# Patient Record
Sex: MALE | Race: WHITE | NOT HISPANIC OR LATINO | Employment: OTHER | URBAN - METROPOLITAN AREA
[De-identification: names, ages, dates, MRNs, and addresses within clinical notes are randomized per-mention and may not be internally consistent; named-entity substitution may affect disease eponyms.]

---

## 2017-08-24 ENCOUNTER — HISTORICAL (OUTPATIENT)
Dept: RADIOLOGY | Facility: HOSPITAL | Age: 62
End: 2017-08-24

## 2018-02-23 ENCOUNTER — HISTORICAL (OUTPATIENT)
Dept: LAB | Facility: HOSPITAL | Age: 63
End: 2018-02-23

## 2018-02-23 LAB
ABS NEUT (OLG): 3.9 X10(3)/MCL (ref 1.5–6.9)
ALBUMIN SERPL-MCNC: 4.1 GM/DL (ref 3.4–5)
ALBUMIN/GLOB SERPL: 1.1 RATIO
ALP SERPL-CCNC: 63 UNIT/L (ref 30–113)
ALT SERPL-CCNC: 38 UNIT/L (ref 10–45)
APPEARANCE, UA: CLEAR
AST SERPL-CCNC: 24 UNIT/L (ref 15–37)
BILIRUB SERPL-MCNC: 2.3 MG/DL (ref 0.1–0.9)
BILIRUB UR QL STRIP: NEGATIVE
BILIRUBIN DIRECT+TOT PNL SERPL-MCNC: 0.3 MG/DL (ref 0–0.3)
BILIRUBIN DIRECT+TOT PNL SERPL-MCNC: 2 MG/DL
BUN SERPL-MCNC: 14 MG/DL (ref 10–20)
CALCIUM SERPL-MCNC: 8.9 MG/DL (ref 8–10.5)
CHLORIDE SERPL-SCNC: 105 MMOL/L (ref 100–108)
CHOLEST SERPL-MCNC: 181 MG/DL (ref 140–200)
CHOLEST/HDLC SERPL: 3 MG/DL (ref 0–8)
CO2 SERPL-SCNC: 25 MMOL/L (ref 21–35)
COLOR UR: ABNORMAL
CREAT SERPL-MCNC: 1.02 MG/DL (ref 0.7–1.3)
CRP SERPL-MCNC: <0.2 MG/DL (ref 0–0.9)
ERYTHROCYTE [DISTWIDTH] IN BLOOD BY AUTOMATED COUNT: 13.4 % (ref 11.5–17)
ERYTHROCYTE [SEDIMENTATION RATE] IN BLOOD: 1 MM/HR (ref 0–15)
GLOBULIN SER-MCNC: 3.6 GM/DL
GLUCOSE (UA): NEGATIVE
GLUCOSE SERPL-MCNC: 110 MG/DL (ref 75–116)
HCT VFR BLD AUTO: 49.1 % (ref 42–52)
HDLC SERPL-MCNC: 55 MG/DL (ref 35–59)
HEMOCCULT SP2 STL QL: NEGATIVE
HGB BLD-MCNC: 16.7 GM/DL (ref 14–18)
HGB UR QL STRIP: NEGATIVE
KETONES UR QL STRIP: ABNORMAL
LDLC SERPL CALC-MCNC: 120 MG/DL (ref 100–129)
LEUKOCYTE ESTERASE UR QL STRIP: NEGATIVE
MCH RBC QN AUTO: 32 PG (ref 27–34)
MCHC RBC AUTO-ENTMCNC: 34 GM/DL (ref 31–36)
MCV RBC AUTO: 93 FL (ref 80–99)
NITRITE UR QL STRIP: NEGATIVE
PH UR STRIP: 6 [PH]
PLATELET # BLD AUTO: 213 X10(3)/MCL (ref 140–400)
PMV BLD AUTO: 10.8 FL (ref 6.8–10)
POTASSIUM SERPL-SCNC: 4.4 MMOL/L (ref 3.6–5.2)
PROT SERPL-MCNC: 7.7 GM/DL (ref 6.4–8.2)
PROT UR QL STRIP: NEGATIVE
PSA SERPL-MCNC: 1.76 NG/ML (ref 0–4)
RBC # BLD AUTO: 5.3 X10(6)/MCL (ref 4.7–6.1)
SODIUM SERPL-SCNC: 138 MMOL/L (ref 135–145)
SP GR UR STRIP: 1.01
TRIGL SERPL-MCNC: 95 MG/DL (ref 35–150)
TSH SERPL-ACNC: 0.92 MIU/ML (ref 0.36–3.74)
UROBILINOGEN UR STRIP-ACNC: 0.2 EU/DL
VLDLC SERPL CALC-MCNC: 19 MG/DL
WBC # SPEC AUTO: 5.7 X10(3)/MCL (ref 4.5–11.5)

## 2022-08-16 DIAGNOSIS — G43.111 MIGRAINE WITH AURA, WITH INTRACTABLE MIGRAINE, SO STATED, WITH STATUS MIGRAINOSUS: Primary | ICD-10-CM

## 2023-08-07 DIAGNOSIS — M54.2 CERVICALGIA: Primary | ICD-10-CM

## 2023-08-10 ENCOUNTER — HOSPITAL ENCOUNTER (OUTPATIENT)
Dept: RADIOLOGY | Facility: HOSPITAL | Age: 68
Discharge: HOME | End: 2023-08-10
Attending: INTERNAL MEDICINE
Payer: MEDICARE

## 2023-08-10 DIAGNOSIS — M54.2 CERVICALGIA: ICD-10-CM

## 2023-08-10 PROCEDURE — 72141 MRI NECK SPINE W/O DYE: CPT | Mod: TC

## 2024-05-24 ENCOUNTER — HOSPITAL ENCOUNTER (OUTPATIENT)
Dept: RADIOLOGY | Facility: HOSPITAL | Age: 69
Discharge: HOME | End: 2024-05-24
Attending: INTERNAL MEDICINE
Payer: MEDICARE

## 2024-05-24 DIAGNOSIS — R59.0 LOCALIZED ENLARGED LYMPH NODES: ICD-10-CM

## 2024-05-24 PROCEDURE — 71046 X-RAY EXAM CHEST 2 VIEWS: CPT | Mod: TC

## 2024-05-29 ENCOUNTER — HOSPITAL ENCOUNTER (EMERGENCY)
Facility: HOSPITAL | Age: 69
Discharge: HOME | End: 2024-05-29
Attending: INTERNAL MEDICINE
Payer: MEDICARE

## 2024-05-29 VITALS
RESPIRATION RATE: 14 BRPM | HEIGHT: 66 IN | HEART RATE: 64 BPM | OXYGEN SATURATION: 98 % | SYSTOLIC BLOOD PRESSURE: 145 MMHG | TEMPERATURE: 97 F | DIASTOLIC BLOOD PRESSURE: 85 MMHG | BODY MASS INDEX: 23.46 KG/M2 | WEIGHT: 146 LBS

## 2024-05-29 DIAGNOSIS — R07.9 NONSPECIFIC CHEST PAIN: ICD-10-CM

## 2024-05-29 DIAGNOSIS — R20.2 BILATERAL ARM NUMBNESS AND TINGLING WHILE SLEEPING: Primary | ICD-10-CM

## 2024-05-29 DIAGNOSIS — R20.0 BILATERAL ARM NUMBNESS AND TINGLING WHILE SLEEPING: Primary | ICD-10-CM

## 2024-05-29 PROBLEM — N40.1 BENIGN PROSTATIC HYPERPLASIA WITH URINARY OBSTRUCTION: Status: ACTIVE | Noted: 2018-04-12

## 2024-05-29 PROBLEM — N13.8 BENIGN PROSTATIC HYPERPLASIA WITH URINARY OBSTRUCTION: Status: ACTIVE | Noted: 2018-04-12

## 2024-05-29 LAB
OHS QRS DURATION: 90 MS
OHS QTC CALCULATION: 422 MS

## 2024-05-29 PROCEDURE — 99284 EMERGENCY DEPT VISIT MOD MDM: CPT | Mod: 25

## 2024-05-29 PROCEDURE — 96372 THER/PROPH/DIAG INJ SC/IM: CPT | Performed by: INTERNAL MEDICINE

## 2024-05-29 PROCEDURE — 93005 ELECTROCARDIOGRAM TRACING: CPT

## 2024-05-29 PROCEDURE — 93010 ELECTROCARDIOGRAM REPORT: CPT | Mod: ,,, | Performed by: INTERNAL MEDICINE

## 2024-05-29 PROCEDURE — 63600175 PHARM REV CODE 636 W HCPCS: Performed by: INTERNAL MEDICINE

## 2024-05-29 RX ORDER — ROSUVASTATIN CALCIUM 20 MG/1
20 TABLET, COATED ORAL DAILY
COMMUNITY
Start: 2024-02-23

## 2024-05-29 RX ORDER — EZETIMIBE 10 MG/1
10 TABLET ORAL DAILY
COMMUNITY
Start: 2024-04-08

## 2024-05-29 RX ORDER — DIAZEPAM 10 MG/1
10 TABLET ORAL NIGHTLY
COMMUNITY
Start: 2024-02-24

## 2024-05-29 RX ORDER — DICLOFENAC POTASSIUM 50 MG/1
50 TABLET, FILM COATED ORAL 2 TIMES DAILY
COMMUNITY
Start: 2024-02-23

## 2024-05-29 RX ORDER — DEXAMETHASONE SODIUM PHOSPHATE 4 MG/ML
8 INJECTION, SOLUTION INTRA-ARTICULAR; INTRALESIONAL; INTRAMUSCULAR; INTRAVENOUS; SOFT TISSUE
Status: COMPLETED | OUTPATIENT
Start: 2024-05-29 | End: 2024-05-29

## 2024-05-29 RX ORDER — CITALOPRAM 10 MG/1
10 TABLET ORAL DAILY
COMMUNITY
Start: 2024-05-21

## 2024-05-29 RX ORDER — TRAMADOL HYDROCHLORIDE 50 MG/1
1 TABLET ORAL DAILY
COMMUNITY

## 2024-05-29 RX ORDER — METHOCARBAMOL 500 MG/1
500 TABLET, FILM COATED ORAL 2 TIMES DAILY
COMMUNITY
Start: 2024-04-19

## 2024-05-29 RX ORDER — METHYLPREDNISOLONE 4 MG/1
TABLET ORAL
Qty: 21 EACH | Refills: 0 | Status: SHIPPED | OUTPATIENT
Start: 2024-05-29

## 2024-05-29 RX ORDER — TAMSULOSIN HYDROCHLORIDE 0.4 MG/1
1 CAPSULE ORAL DAILY
COMMUNITY

## 2024-05-29 RX ORDER — AMLODIPINE BESYLATE 10 MG/1
10 TABLET ORAL DAILY
COMMUNITY
Start: 2024-05-21

## 2024-05-29 RX ADMIN — DEXAMETHASONE SODIUM PHOSPHATE 8 MG: 4 INJECTION, SOLUTION INTRA-ARTICULAR; INTRALESIONAL; INTRAMUSCULAR; INTRAVENOUS; SOFT TISSUE at 09:05

## 2024-05-29 NOTE — ED PROVIDER NOTES
Encounter Date: 5/29/2024  History from patient     History     Chief Complaint   Patient presents with    Chest Pain     Pt c/o sting pain in both arms and chest pressure x 2-3 days, states symptoms worsened yesterday.     HPI    Anish Su is 68 y.o. male who  has a past medical history of Anxiety disorder, unspecified, GERD (gastroesophageal reflux disease), Hypercholesterolemia, Hypertension, and Irritable bowel syndrome without diarrhea. arrives in ER with c/o Chest Pain (Pt c/o sting pain in both arms and chest pressure x 2-3 days, states symptoms worsened yesterday.)      Review of patient's allergies indicates:  No Known Allergies  Current Outpatient Medications   Medication Instructions    amLODIPine (NORVASC) 10 mg, Oral, Daily    citalopram (CELEXA) 10 mg, Oral, Daily    diazePAM (VALIUM) 10 mg, Oral, Nightly    diclofenac (CATAFLAM) 50 mg, Oral, 2 times daily    ezetimibe (ZETIA) 10 mg, Oral, Daily    methocarbamoL (ROBAXIN) 500 mg, Oral, 2 times daily    methylPREDNISolone (MEDROL DOSEPACK) 4 mg tablet use as directed    rosuvastatin (CRESTOR) 20 mg, Oral, Daily    tamsulosin (FLOMAX) 0.4 mg Cap 1 capsule, Oral, Daily    traMADoL (ULTRAM) 50 mg tablet 1 tablet, Oral, Daily       Past Medical History:   Diagnosis Date    Anxiety disorder, unspecified     GERD (gastroesophageal reflux disease)     Hypercholesterolemia     Hypertension     Irritable bowel syndrome without diarrhea      Past Surgical History:   Procedure Laterality Date    HERNIA REPAIR Bilateral     Inguinal     Family History   Problem Relation Name Age of Onset    Dementia Mother      Dementia Father       Social History     Tobacco Use    Smoking status: Never    Smokeless tobacco: Never   Substance Use Topics    Alcohol use: Never    Drug use: Never     Review of Systems   Constitutional:  Negative for fever.   HENT:  Negative for trouble swallowing and voice change.    Eyes:  Negative for visual disturbance.   Respiratory:  Positive  for chest tightness. Negative for cough and shortness of breath.    Cardiovascular:  Negative for chest pain.   Gastrointestinal:  Negative for abdominal pain, diarrhea and vomiting.   Genitourinary:  Negative for dysuria and hematuria.   Musculoskeletal:  Negative for back pain and gait problem.   Skin:  Negative for color change and rash.   Neurological:  Positive for numbness. Negative for headaches.        Numbness and tingling in arms off and on mainly when he gets in the morning, which gets better after some time    Psychiatric/Behavioral:  Negative for behavioral problems and sleep disturbance.    All other systems reviewed and are negative.      Physical Exam     Initial Vitals   BP Pulse Resp Temp SpO2   05/29/24 0853 05/29/24 0853 05/29/24 0856 05/29/24 0853 05/29/24 0853   (!) 167/93 70 18 97.3 °F (36.3 °C) 98 %      MAP       --                Physical Exam    Nursing note and vitals reviewed.  Constitutional: He appears well-developed and well-nourished. No distress.   HENT:   Head: Atraumatic.   Eyes: EOM are normal.   Neck: Neck supple.   Normal range of motion.  Cardiovascular:  Normal rate, regular rhythm and normal heart sounds.           Pulmonary/Chest: Breath sounds normal. No respiratory distress. He has no wheezes. He has no rales.   Abdominal: Abdomen is soft. Bowel sounds are normal.   Musculoskeletal:         General: Normal range of motion.      Cervical back: Normal range of motion and neck supple. No bony tenderness.     Neurological: He is alert and oriented to person, place, and time. He has normal strength and normal reflexes. No cranial nerve deficit or sensory deficit. GCS score is 15. GCS eye subscore is 4. GCS verbal subscore is 5. GCS motor subscore is 6.   Speech Normal   Skin: Skin is warm and dry.   Psychiatric: He has a normal mood and affect.   Pleasant         ED Course   Procedures  Labs Reviewed - No data to display     ECG Results              EKG 12-lead (Preliminary  result)  Result time 05/29/24 09:14:02      Wet Read by Fidencio Boateng MD (05/29/24 09:14:02, [facility] - Emergency Dept, Emergency Medicine)    EKG Initial Reading: Independently Interpreted by Fidencio Boateng MD. independently as: No STEMI. Rhythm: Normal Sinus Rhythm, Rate 69. Ectopy: No Ectopy. Conduction: Normal. ST Segments: Normal ST Segments. T Waves: Normal. Axis: Normal.                                   Imaging Results    None          Medications   dexAMETHasone injection 8 mg (8 mg Intramuscular Given 5/29/24 0913)     Medical Decision Making    Anish Su is 68 y.o. male who  has a past medical history of Anxiety disorder, unspecified, GERD (gastroesophageal reflux disease), Hypercholesterolemia, Hypertension, and Irritable bowel syndrome without diarrhea. arrives in ER with c/o Chest Pain (Pt c/o sting pain in both arms and chest pressure x 2-3 days, states symptoms worsened yesterday.)    Patient has been having numbness and tingling in bilateral arms for the last year or 2, he had an MRI done on the neck he was told that he has C5-C6 disc problem, he says when he wakes up in the morning his both arms are numb, they gradually get better, he says that he was working on something and it started having chest pain so he went to the cardiologist last month, they did the stress test and cardiac catheterization and he was told that he has perfectly normal blood vessels in the heart, for the last 3 or 4 days he is having some numbness in the morning again, so he woke up with numbness in the arms today again he called his neurosurgeon and they told him that they are going to call him back, then he got a little anxious and felt that he needs to come to the emergency room to get checked.  But his symptoms are resolved now.    EKG is perfectly normal on arrival.    Risk  Prescription drug management.               ED Course as of 05/29/24 0921   Wed May 29, 2024   0914 Patient has a normal  examination at this time, normal EKG at this time, he already had a cardiac catheterization done last month which was reported to him as negative, I advised him that he does not have any motor deficit or even sensory deficit at this time, the best would be to call his neurosurgeon and asked him what else needs to be done.  Patient says that he went to the neurosurgeon 9 months back, they told him that it has not significant enough and he can come back in 6 months, he went back 6 months later, they told him still not significant enough and he can just follow up next year, but he keeps having these symptoms mainly in the morning and he wants to know why he is getting these symptoms.  I have advised him that the he does not appear like having acute heart attack at this time, especially he had a normal catheterization just done last month, with a normal EKG today I do not think he is having any problem with the heart, of course he needs to talk to his neurosurgeon in his family doctor for further instructions about the numbness in the arms, patient says they prescribed him methocarbamol and meloxicam but that has not working.  I advised him that I will give him a steroid shot, so if he does have any swelling on his disc that will help it out, and the best thing would be to  the telephone and talk to the neurosurgeon again. [GQ]   7940 Patient says that he has taken Medrol Dosepak in the past, and he feels he can take it again, I will go ahead and prescribe that to him. [GQ]      ED Course User Index  [GQ] Fidencio Boateng MD                           Clinical Impression:  Final diagnoses:  [R07.9] Nonspecific chest pain  [R20.0, R20.2] Bilateral arm numbness and tingling while sleeping (Primary)          ED Disposition Condition    Discharge Stable          ED Prescriptions       Medication Sig Dispense Start Date End Date Auth. Provider    methylPREDNISolone (MEDROL DOSEPACK) 4 mg tablet use as directed 21  each 5/29/2024 -- Fidencio Boateng MD          Follow-up Information       Follow up With Specialties Details Why Contact Info    PMD  In 2 days      Neurosurgeon  Call today               Fidencio Boateng MD  05/29/24 0995

## 2024-05-29 NOTE — DISCHARGE INSTRUCTIONS
Must talk to your neurosurgeon and your family doctor for further instructions as to what can be done about your numbness and tingling in the arms        Take medicines as prescribed    See your family doctor in one to 2 days for further evaluation, workup, and treatment as necessary    Avoid driving or operating machinery while taking medicines as some medicines might cause drowsiness and may cause problems. Also pain medicines have potential of being addictive  so use Pain meds specially Narcotics Sparingly.    The exam and treatment you received in Emergency Room was for an urgent problem and NOT INTENDED AS COMPLETE CARE. It is important that you FOLLOW UP with a doctor for ongoing care. If your symptoms become WORSE or you DO NOT IMPROVE and you are unable to reach your health care provider, you should RETURN to the emergency department. The Emergency Room doctor has provided a PRELIMINARY INTERPRETATION of all your STUDIES. A final interpretation may be done after you are discharged. IF A CHANGE in your diagnosis or treatment is needed WE WILL CONTACT YOU. It is critical that we have a CURRENT PHONE NUMBER FOR YOU.

## 2024-06-20 DIAGNOSIS — M47.812 CERVICAL SPONDYLOSIS: Primary | ICD-10-CM

## 2024-08-13 ENCOUNTER — OFFICE VISIT (OUTPATIENT)
Facility: CLINIC | Age: 69
End: 2024-08-13
Payer: MEDICARE

## 2024-08-13 VITALS
HEART RATE: 73 BPM | SYSTOLIC BLOOD PRESSURE: 136 MMHG | DIASTOLIC BLOOD PRESSURE: 80 MMHG | HEIGHT: 66 IN | WEIGHT: 146 LBS | BODY MASS INDEX: 23.46 KG/M2

## 2024-08-13 DIAGNOSIS — M54.2 CHRONIC NECK PAIN: ICD-10-CM

## 2024-08-13 DIAGNOSIS — M47.812 CERVICAL SPONDYLOSIS: Primary | ICD-10-CM

## 2024-08-13 DIAGNOSIS — G89.29 CHRONIC NECK PAIN: ICD-10-CM

## 2024-08-13 DIAGNOSIS — M50.30 DDD (DEGENERATIVE DISC DISEASE), CERVICAL: ICD-10-CM

## 2024-08-13 PROCEDURE — 99203 OFFICE O/P NEW LOW 30 MIN: CPT | Mod: ,,, | Performed by: ANESTHESIOLOGY

## 2024-08-13 RX ORDER — DIPHENOXYLATE HYDROCHLORIDE AND ATROPINE SULFATE 2.5; .025 MG/1; MG/1
2 TABLET ORAL 4 TIMES DAILY PRN
COMMUNITY
Start: 2024-04-06

## 2024-08-13 RX ORDER — HYOSCYAMINE SULFATE 0.12 MG/1
0.12 TABLET SUBLINGUAL EVERY 4 HOURS PRN
COMMUNITY
Start: 2024-04-08

## 2024-08-13 RX ORDER — DICLOFENAC SODIUM 50 MG/1
50 TABLET, DELAYED RELEASE ORAL 2 TIMES DAILY PRN
COMMUNITY
Start: 2024-06-13

## 2024-08-13 RX ORDER — PANTOPRAZOLE SODIUM 40 MG/1
40 TABLET, DELAYED RELEASE ORAL EVERY MORNING
COMMUNITY
Start: 2024-07-26
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
## 2024-08-14 ENCOUNTER — TELEPHONE (OUTPATIENT)
Facility: CLINIC | Age: 69
End: 2024-08-14
Payer: MEDICARE

## 2024-11-19 ENCOUNTER — OFFICE VISIT (OUTPATIENT)
Facility: CLINIC | Age: 69
End: 2024-11-19
Payer: MEDICARE

## 2024-11-19 VITALS
RESPIRATION RATE: 20 BRPM | BODY MASS INDEX: 24.11 KG/M2 | DIASTOLIC BLOOD PRESSURE: 80 MMHG | OXYGEN SATURATION: 98 % | WEIGHT: 150 LBS | SYSTOLIC BLOOD PRESSURE: 134 MMHG | HEIGHT: 66 IN | HEART RATE: 71 BPM

## 2024-11-19 DIAGNOSIS — M54.2 CHRONIC NECK PAIN: ICD-10-CM

## 2024-11-19 DIAGNOSIS — G89.29 CHRONIC NECK PAIN: ICD-10-CM

## 2024-11-19 DIAGNOSIS — M50.30 DDD (DEGENERATIVE DISC DISEASE), CERVICAL: ICD-10-CM

## 2024-11-19 DIAGNOSIS — M50.123 CERVICAL DISC DISORDER AT C6-C7 LEVEL WITH RADICULOPATHY: ICD-10-CM

## 2024-11-19 DIAGNOSIS — M47.812 CERVICAL SPONDYLOSIS: Primary | ICD-10-CM

## 2024-11-19 PROCEDURE — 99213 OFFICE O/P EST LOW 20 MIN: CPT | Mod: ,,, | Performed by: ANESTHESIOLOGY

## 2024-11-19 RX ORDER — PREDNISONE 20 MG/1
20 TABLET ORAL
COMMUNITY
Start: 2024-09-10

## 2024-11-19 RX ORDER — ALBUTEROL SULFATE 0.83 MG/ML
2.5 SOLUTION RESPIRATORY (INHALATION) 3 TIMES DAILY
COMMUNITY
Start: 2024-08-21

## 2024-11-19 RX ORDER — FLUTICASONE PROPIONATE 50 MCG
SPRAY, SUSPENSION (ML) NASAL
COMMUNITY
Start: 2024-11-14

## 2024-11-19 RX ORDER — FLUCONAZOLE 200 MG/1
200 TABLET ORAL 2 TIMES DAILY
COMMUNITY

## 2024-11-19 RX ORDER — HYDROCHLOROTHIAZIDE 12.5 MG/1
12.5 CAPSULE ORAL
COMMUNITY
Start: 2024-10-14
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
## 2024-12-26 ENCOUNTER — TELEPHONE (OUTPATIENT)
Facility: CLINIC | Age: 69
End: 2024-12-26

## 2024-12-26 NOTE — TELEPHONE ENCOUNTER
Pt called stating have trouble breathing in the morning,bp is elevated,wakes up w/hand numbness/tingling, high BP,seeing little black spots. States cervical pain still present. I advised pt to go into ER or Urgent care if symptoms get worse or continue.

## 2024-12-31 ENCOUNTER — TELEPHONE (OUTPATIENT)
Facility: CLINIC | Age: 69
End: 2024-12-31
Payer: MEDICARE

## 2024-12-31 NOTE — TELEPHONE ENCOUNTER
Pt contacted office stating he is having cataract surgery on 1/07/25, then about 2 weeks later the other eye,    pt is on schedule for 1/16/25, how far out does he need to be r/s. Please advise.

## 2025-01-06 ENCOUNTER — TELEPHONE (OUTPATIENT)
Facility: CLINIC | Age: 70
End: 2025-01-06
Payer: MEDICARE

## 2025-02-14 ENCOUNTER — TELEPHONE (OUTPATIENT)
Facility: CLINIC | Age: 70
End: 2025-02-14
Payer: MEDICARE

## 2025-02-14 NOTE — TELEPHONE ENCOUNTER
Attempted to contact pt to schedule procedure LDVM for pt to contact office, will have to ask Dr Lake when to schedule bc he just had 2 cataract surgeries may have to obtain clearance from ophthalmologist.

## 2025-06-28 ENCOUNTER — HOSPITAL ENCOUNTER (EMERGENCY)
Facility: HOSPITAL | Age: 70
Discharge: HOME | End: 2025-06-28
Attending: INTERNAL MEDICINE
Payer: MEDICARE

## 2025-06-28 VITALS
DIASTOLIC BLOOD PRESSURE: 88 MMHG | OXYGEN SATURATION: 99 % | HEIGHT: 66 IN | SYSTOLIC BLOOD PRESSURE: 158 MMHG | RESPIRATION RATE: 19 BRPM | HEART RATE: 75 BPM | WEIGHT: 145 LBS | TEMPERATURE: 98 F | BODY MASS INDEX: 23.3 KG/M2

## 2025-06-28 DIAGNOSIS — S43.005A SHOULDER DISLOCATION, LEFT, INITIAL ENCOUNTER: ICD-10-CM

## 2025-06-28 PROCEDURE — 63600175 PHARM REV CODE 636 W HCPCS: Performed by: INTERNAL MEDICINE

## 2025-06-28 PROCEDURE — 99285 EMERGENCY DEPT VISIT HI MDM: CPT | Mod: 25

## 2025-06-28 PROCEDURE — 96374 THER/PROPH/DIAG INJ IV PUSH: CPT | Mod: 59

## 2025-06-28 PROCEDURE — 23650 CLTX SHO DSLC W/MNPJ WO ANES: CPT | Mod: LT

## 2025-06-28 RX ORDER — METHOCARBAMOL 500 MG/1
500 TABLET, FILM COATED ORAL 3 TIMES DAILY
Qty: 30 TABLET | Refills: 0 | Status: SHIPPED | OUTPATIENT
Start: 2025-06-28

## 2025-06-28 RX ORDER — FENTANYL CITRATE 50 UG/ML
100 INJECTION, SOLUTION INTRAMUSCULAR; INTRAVENOUS
Refills: 0 | Status: COMPLETED | OUTPATIENT
Start: 2025-06-28 | End: 2025-06-28

## 2025-06-28 RX ORDER — NAPROXEN 500 MG/1
500 TABLET ORAL 2 TIMES DAILY WITH MEALS
Qty: 30 TABLET | Refills: 0 | Status: SHIPPED | OUTPATIENT
Start: 2025-06-28 | End: 2025-07-13

## 2025-06-28 RX ADMIN — FENTANYL CITRATE 100 MCG: 50 INJECTION INTRAMUSCULAR; INTRAVENOUS at 02:06

## 2025-06-28 NOTE — ED PROVIDER NOTES
Encounter Date: 6/28/2025  History from patient     History     Chief Complaint   Patient presents with    Shoulder Pain     C/o left dislocated shoulder after falling onto a roof while working on it     HPI    Anish Su is 69 y.o. male who  has a past medical history of Anxiety disorder, unspecified, GERD (gastroesophageal reflux disease), Hypercholesterolemia, Hypertension, Irritable bowel syndrome without diarrhea, and Sleep apnea, unspecified. arrives in ER with c/o Shoulder Pain (C/o left dislocated shoulder after falling onto a roof while working on it)    Review of patient's allergies indicates:  No Known Allergies  Past Medical History:   Diagnosis Date    Anxiety disorder, unspecified     GERD (gastroesophageal reflux disease)     Hypercholesterolemia     Hypertension     Irritable bowel syndrome without diarrhea     Sleep apnea, unspecified      Past Surgical History:   Procedure Laterality Date    HERNIA REPAIR Bilateral     Inguinal     Family History   Problem Relation Name Age of Onset    Dementia Mother      Dementia Father       Social History[1]  Review of Systems   Constitutional:  Negative for fever.   HENT:  Negative for trouble swallowing and voice change.    Eyes:  Negative for visual disturbance.   Respiratory:  Negative for cough and shortness of breath.    Cardiovascular:  Negative for chest pain.   Gastrointestinal:  Negative for abdominal pain, diarrhea and vomiting.   Genitourinary:  Negative for dysuria and hematuria.   Musculoskeletal:  Negative for back pain and gait problem.        Right shoulder pain and it is out of his place   Skin:  Negative for color change and rash.   Neurological:  Negative for headaches.   Psychiatric/Behavioral:  Negative for behavioral problems and sleep disturbance.    All other systems reviewed and are negative.      Physical Exam     Initial Vitals [06/28/25 1426]   BP Pulse Resp Temp SpO2   (!) 169/92 74 18 97.9 °F (36.6 °C) 98 %      MAP       --          Physical Exam    Nursing note and vitals reviewed.  Constitutional: He appears well-developed and well-nourished.   Eyes: EOM are normal.   Neck: Neck supple.   Cardiovascular:  Normal rate.           Pulmonary/Chest: Breath sounds normal.   Abdominal: Abdomen is soft. Bowel sounds are normal.   Musculoskeletal:      Left shoulder: Deformity and tenderness present. Decreased range of motion. Normal pulse.      Cervical back: Neck supple.      Comments: Patient has a step-off of the left shoulder.  Decreased range of motion, no signs of injury on other extremities.  Spine is nontender     Neurological: He is alert and oriented to person, place, and time. GCS score is 15. GCS eye subscore is 4. GCS verbal subscore is 5. GCS motor subscore is 6.   Skin: Skin is warm and dry.         ED Course   Orthopedic Injury    Date/Time: 6/28/2025 2:58 PM    Performed by: Fidencio Boateng MD  Authorized by: Fidencio Boateng MD    Location procedure was performed:  [facility] EMERGENCY DEPARTMENT  Consent Done?:  Yes  Universal Protocol:     Verbal consent obtained?: Yes      Risks and benefits: Risks, benefits and alternatives were discussed      Consent given by:  Patient    Patient states understanding of procedure being performed: Yes      Patient's understanding of procedure matches consent: Yes      Patient identity confirmed:  Provided demographic data    Time Out: Immediately prior to the procedure a time out was called    Injury:     Injury location:  Shoulder    Location details:  Left shoulder    Injury type:  Dislocation    Dislocation type: anterior      Chronicity:  Recurrent    Hill-Sachs deformity?: No        Pre-procedure assessment:     Neurovascular status: Neurovascularly intact      Distal perfusion: normal      Neurological function: normal      Range of motion: reduced      Local anesthesia used?: No      Patient sedated?: No        Procedure details:     Description of findings:  Left shoulder step-off  with reduced range of motion,  Selections made in this section will also lock the Injury type section above.:     Manipulation performed?: Yes      Reduction method:  Scapular manipulation    Reduction successful?: Yes      Confirmation: Reduction confirmed by x-ray      Immobilization:  Sling    Complications: No    Post-procedure assessment:     Neurovascular status: Neurovascularly intact      Distal perfusion: normal      Neurological function: normal      Range of motion: normal      Patient tolerance:  Patient tolerated the procedure well with no immediate complications     Patient is willing to try reduction without sedation, gave him fentanyl 100 mg IV push, and once he relax I was able to do the scapular manipulation and I was able to reduce the shoulder successfully.    Got x-ray done which shows humeral head in good opposition to glenoid cavity, applied sling and swath.  Will put patient on Robaxin and naproxen and let him go home.    Orders Placed This Encounter    ORTHOPEDIC INJURY TREATMENT    X-Ray Shoulder Trauma Left    fentaNYL injection 100 mcg    methocarbamoL (ROBAXIN) 500 MG Tab    naproxen (NAPROSYN) 500 MG tablet       Labs Reviewed - No data to display       Imaging Results              X-Ray Shoulder Trauma Left (Final result)  Result time 06/28/25 15:15:10      Final result by Morales Gramajo MD (06/28/25 15:15:10)                   Impression:      Degenerative changes are identified.  No acute abnormalities are seen.      Electronically signed by: Morales Gramajo  Date:    06/28/2025  Time:    15:15               Narrative:    EXAMINATION:  XR SHOULDER TRAUMA 3 VIEW LEFT    CLINICAL HISTORY:  Unspecified dislocation of left shoulder joint, initial encounter    TECHNIQUE:  Three views of the left shoulder were performed.    COMPARISON  November 3, 2015    FINDINGS:  There is good bony mineralization.  No bony fracture or dislocation is seen.  Degenerative changes are identified  in the acromioclavicular joint.  There is narrowing of the acromial humeral joint space.                                       Medications   fentaNYL injection 100 mcg (100 mcg Intravenous Given 6/28/25 6449)     Medical Decision Making    Anish Su is 69 y.o. male who  has a past medical history of Anxiety disorder, unspecified, GERD (gastroesophageal reflux disease), Hypercholesterolemia, Hypertension, Irritable bowel syndrome without diarrhea, and Sleep apnea, unspecified. arrives in ER with c/o Shoulder Pain (C/o left dislocated shoulder after falling onto a roof while working on it)    Patient was working on his roof when he slipped and fell onto the roof itself, he has a flat portion of the roof did not fall to the ground.  But in the process his left shoulder came out of the place.  He has had dislocation of the shoulder in the past, so he called his neighbor to bring him to the emergency room.    Says he tried to put the shoulder back in place by putting baseball in his armpit but he could not.        Amount and/or Complexity of Data Reviewed  Radiology: ordered.    Risk  Prescription drug management.                                      Clinical Impression:  Final diagnoses:  [S43.005A] Shoulder dislocation, left, initial encounter          ED Disposition Condition    Discharge Stable          ED Prescriptions       Medication Sig Dispense Start Date End Date Auth. Provider    methocarbamoL (ROBAXIN) 500 MG Tab Take 1 tablet (500 mg total) by mouth 3 (three) times daily. 30 tablet 6/28/2025 -- Fidencio Boateng MD    naproxen (NAPROSYN) 500 MG tablet Take 1 tablet (500 mg total) by mouth 2 (two) times daily with meals. for 15 days 30 tablet 6/28/2025 7/13/2025 Fidencio Boateng MD          Follow-up Information       Follow up With Specialties Details Why Contact Info    Mayito Valles III, MD Internal Medicine In 2 days  [address]  [address]  [place] [address] [phone]      Orthopedics   Call                      [1]   Social History  Tobacco Use    Smoking status: Never    Smokeless tobacco: Never   Substance Use Topics    Alcohol use: Never    Drug use: Never        Fidencio Boateng MD  06/28/25 1527